# Patient Record
Sex: MALE | Race: WHITE | URBAN - METROPOLITAN AREA
[De-identification: names, ages, dates, MRNs, and addresses within clinical notes are randomized per-mention and may not be internally consistent; named-entity substitution may affect disease eponyms.]

---

## 2021-05-18 ENCOUNTER — OFFICE VISIT (OUTPATIENT)
Dept: URBAN - METROPOLITAN AREA CLINIC 50 | Facility: CLINIC | Age: 26
End: 2021-05-18

## 2021-05-25 ENCOUNTER — OFFICE VISIT (OUTPATIENT)
Dept: URBAN - METROPOLITAN AREA CLINIC 50 | Facility: CLINIC | Age: 26
End: 2021-05-25
Payer: COMMERCIAL

## 2021-05-25 ENCOUNTER — WEB ENCOUNTER (OUTPATIENT)
Dept: URBAN - METROPOLITAN AREA CLINIC 50 | Facility: CLINIC | Age: 26
End: 2021-05-25

## 2021-05-25 VITALS
HEART RATE: 56 BPM | WEIGHT: 223 LBS | SYSTOLIC BLOOD PRESSURE: 131 MMHG | BODY MASS INDEX: 25.8 KG/M2 | DIASTOLIC BLOOD PRESSURE: 71 MMHG | TEMPERATURE: 98.2 F | HEIGHT: 78 IN

## 2021-05-25 DIAGNOSIS — K51.919 ULCERATIVE COLITIS WITH COMPLICATION, UNSPECIFIED LOCATION: ICD-10-CM

## 2021-05-25 DIAGNOSIS — K90.0 CELIAC DISEASE: ICD-10-CM

## 2021-05-25 PROCEDURE — 99244 OFF/OP CNSLTJ NEW/EST MOD 40: CPT | Performed by: INTERNAL MEDICINE

## 2021-05-25 RX ORDER — POLYETHYLENE GLYCOL 3350, SODIUM SULFATE, SODIUM CHLORIDE, POTASSIUM CHLORIDE, ASCORBIC ACID, SODIUM ASCORBATE 140-9-5.2G
AS DIRECTED KIT ORAL ONCE
Qty: 1 BOX | Refills: 0 | OUTPATIENT
Start: 2021-05-25 | End: 2021-05-26

## 2021-05-25 NOTE — HPI-TODAY'S VISIT:
referred by Dr. Raúl Mclaughlin pcp for crohns/uc/celiac? copy of the consult was sent to referring MD was living in [place] was to play pro baseball moved back to [place] recently diagnosed at age 3-4 with crohns/UC very sick at age 9 was in remission at age 9-10. was at [facility]. was on medications, does not know the name infrequent flares. in 2016, saw specialist in [place], Dr. Lowe diagnosed w/ celiac disease and Ulcerative colitis at that time. states he had genetic testing done. confused with all his diagnoses and wants clarification moving fwd.  had egd/colon at that time. went [facility] and things are better can have 6-8 BMs a day lower abdominal pain no blood/mucus on a regular basis went to [place] last week and ate gluten, really paid for it

## 2021-06-02 LAB
CALPROTECTIN, FECAL: 30
LACTOFERRIN, FECAL, QUANT.: <1

## 2021-06-04 LAB
A/G RATIO: 2.1
ACCA: 21
ADDITIONAL INFORMATION:: (no result)
ALBUMIN: 4.9
ALCA: 18
ALKALINE PHOSPHATASE: 64
ALT (SGPT): 26
AMCA: 51
ANCA BY IFA (RDL): NEGATIVE
ANTI-PANCREATIC AB BY IFA RDL: NEGATIVE
ASCA, IGA: <20
ASCA, IGG: <20
AST (SGOT): 19
BILIRUBIN, TOTAL: 0.6
BUN/CREATININE RATIO: 14
BUN: 17
C-REACTIVE PROTEIN, QUANT: <1
CALCIUM: 9.8
CARBON DIOXIDE, TOTAL: 24
CHLORIDE: 101
COMMENT:: (no result)
CREATININE: 1.2
DEAMIDATED GLIADIN ABS, IGA: 7
DEAMIDATED GLIADIN ABS, IGG: 10
DQ2 (DQA1 0501/0505,DQB1 02XX): POSITIVE
DQ8 (DQA1 03XX, DQB1 0302): NEGATIVE
EGFR IF AFRICN AM: 97
EGFR IF NONAFRICN AM: 84
ENDOMYSIAL ANTIBODY IGA: NEGATIVE
FERRITIN, SERUM: 145
GASCA: 10
GLOBULIN, TOTAL: 2.3
GLUCOSE: 94
HEMATOCRIT: 50
HEMOGLOBIN: 16.9
IMMUNOGLOBULIN A, QN, SERUM: 186
IRON BIND.CAP.(TIBC): 325
IRON SATURATION: 32
IRON: 104
Lab: (no result)
MCH: 30.5
MCHC: 33.8
MCV: 90
NRBC: (no result)
PLATELETS: 222
POTASSIUM: 4.6
PROTEIN, TOTAL: 7.2
RBC: 5.55
RDW: 12.2
SODIUM: 138
T-TRANSGLUTAMINASE (TTG) IGA: 20
T-TRANSGLUTAMINASE (TTG) IGG: 3
TTG/DGP SCREEN: POSITIVE
UIBC: 221
VITAMIN B12: 461
WBC: 4.6

## 2021-06-07 PROBLEM — 64766004: Status: ACTIVE | Noted: 2021-05-25

## 2021-06-09 ENCOUNTER — OFFICE VISIT (OUTPATIENT)
Dept: URBAN - METROPOLITAN AREA SURGERY CENTER 18 | Facility: SURGERY CENTER | Age: 26
End: 2021-06-09
Payer: COMMERCIAL

## 2021-06-09 ENCOUNTER — TELEPHONE ENCOUNTER (OUTPATIENT)
Dept: URBAN - METROPOLITAN AREA CLINIC 92 | Facility: CLINIC | Age: 26
End: 2021-06-09

## 2021-06-09 DIAGNOSIS — K90.0 CELIAC DISEASE: ICD-10-CM

## 2021-06-09 DIAGNOSIS — K63.89 BACTERIAL OVERGROWTH SYNDROME: ICD-10-CM

## 2021-06-09 DIAGNOSIS — K29.30 CHRONIC SUPERFICIAL GASTRITIS: ICD-10-CM

## 2021-06-09 DIAGNOSIS — Z87.19 H/O ACUTE PANCREATITIS: ICD-10-CM

## 2021-06-09 DIAGNOSIS — K21.00 ALKALINE REFLUX ESOPHAGITIS: ICD-10-CM

## 2021-06-09 PROBLEM — 396331005: Status: ACTIVE | Noted: 2021-05-25

## 2021-06-09 PROCEDURE — G8907 PT DOC NO EVENTS ON DISCHARG: HCPCS | Performed by: INTERNAL MEDICINE

## 2021-06-09 PROCEDURE — 45380 COLONOSCOPY AND BIOPSY: CPT | Performed by: INTERNAL MEDICINE

## 2021-06-09 PROCEDURE — 43239 EGD BIOPSY SINGLE/MULTIPLE: CPT | Performed by: INTERNAL MEDICINE

## 2021-06-14 ENCOUNTER — TELEPHONE ENCOUNTER (OUTPATIENT)
Dept: URBAN - METROPOLITAN AREA CLINIC 23 | Facility: CLINIC | Age: 26
End: 2021-06-14

## 2021-06-21 ENCOUNTER — LAB OUTSIDE AN ENCOUNTER (OUTPATIENT)
Dept: URBAN - METROPOLITAN AREA CLINIC 96 | Facility: CLINIC | Age: 26
End: 2021-06-21

## 2021-06-23 ENCOUNTER — TELEPHONE ENCOUNTER (OUTPATIENT)
Dept: URBAN - METROPOLITAN AREA CLINIC 92 | Facility: CLINIC | Age: 26
End: 2021-06-23

## 2021-07-06 ENCOUNTER — OFFICE VISIT (OUTPATIENT)
Dept: URBAN - METROPOLITAN AREA TELEHEALTH 2 | Facility: TELEHEALTH | Age: 26
End: 2021-07-06
Payer: COMMERCIAL

## 2021-07-06 DIAGNOSIS — K90.0 CELIAC DISEASE: ICD-10-CM

## 2021-07-06 DIAGNOSIS — K51.80 CHRONIC PANCOLONIC ULCERATIVE COLITIS: ICD-10-CM

## 2021-07-06 PROCEDURE — 97802 MEDICAL NUTRITION INDIV IN: CPT | Performed by: DIETITIAN, REGISTERED

## 2021-07-12 ENCOUNTER — DASHBOARD ENCOUNTERS (OUTPATIENT)
Age: 26
End: 2021-07-12

## 2021-07-13 ENCOUNTER — OFFICE VISIT (OUTPATIENT)
Dept: URBAN - METROPOLITAN AREA CLINIC 50 | Facility: CLINIC | Age: 26
End: 2021-07-13